# Patient Record
Sex: FEMALE | Race: BLACK OR AFRICAN AMERICAN | Employment: OTHER | URBAN - METROPOLITAN AREA
[De-identification: names, ages, dates, MRNs, and addresses within clinical notes are randomized per-mention and may not be internally consistent; named-entity substitution may affect disease eponyms.]

---

## 2017-01-04 ENCOUNTER — HOSPITAL ENCOUNTER (OUTPATIENT)
Dept: PHYSICAL THERAPY | Age: 62
Discharge: HOME | End: 2017-01-04
Payer: COMMERCIAL

## 2017-01-04 PROCEDURE — 97140 MANUAL THERAPY 1/> REGIONS: CPT

## 2017-01-04 PROCEDURE — 97110 THERAPEUTIC EXERCISES: CPT

## 2017-01-04 NOTE — PROGRESS NOTES
PT DAILY TREATMENT NOTE 2-15    Patient Name: Sana Pope  Date:2017  : 1955  [x]  Patient  Verified  Payor: [employer] - NON OBRA / Plan: [facility] HEALTH PLAN - NON OBRA / Product Type: Commerical /    In time: 8:05 AM  Out time: 9:07 AM  Total Treatment Time (min): 62 minutes  Visit #: 3     Treatment Area: Low back pain [M54.5]  Spondylosis without myelopathy or radiculopathy, lumbar region [M47.816]  Sciatica, left side [M54.32]    SUBJECTIVE  Pain Level (0-10 scale): 2/10  Any medication changes, allergies to medications, adverse drug reactions, diagnosis change, or new procedure performed?: [x] No    [] Yes (see summary sheet for update)  Subjective functional status/changes:   [] No changes reported  The Pt reports that her left hip is feeling better, but her left knee is bothering her. She states that it feels tight and achy and the lateral area of the knee to the ankle feels tired. She states that she has been having difficulty doing her exercises at home because of her work schedule and when she gets home she immediately wants to go to sleep. OBJECTIVE    47 min Therapeutic Exercise:  [x] See flow sheet :   Rationale: increase ROM, increase strength, improve coordination, improve balance and increase proprioception to improve the patients ability to perform work duties and ADLs with less pain or discomfort.     15 min Manual Therapy:  Lateral and inferior hip glides using the Mulligan belt bilaterally   Rationale: decrease pain, increase ROM and increase tissue extensibility  to improve the patients ability to perform work duties and ADLs          With   [x] TE   [] TA   [] neuro   [x] other: Patient Education: [x] Review HEP    [] Progressed/Changed HEP based on:   [] positioning   [] body mechanics   [] transfers   [] heat/ice application    [x] other: Helped Pt to problem solve how and when to perform her exercises at home (8:00 AM when she gets home she is going to make that her \"work-out hour\")     Other Objective/Functional Measures: None noted     Pain Level (0-10 scale) post treatment: 0/10    ASSESSMENT/Changes in Function:   The Pt was able to tolerate all of the additions to her exercise program well without any increased pain or discomfort. Patient will continue to benefit from skilled PT services to modify and progress therapeutic interventions, address functional mobility deficits, address ROM deficits, address strength deficits, analyze and address soft tissue restrictions, analyze and cue movement patterns, analyze and modify body mechanics/ergonomics, assess and modify postural abnormalities and instruct in home and community integration to attain remaining goals. []  See Plan of Care  []  See progress note/recertification  []  See Discharge Summary         Progress towards goals / Updated goals:  Short Term Goals: To be accomplished in 6 treatments:  1. The Pt will be independent and compliant with her HEP. 2. The Pt will report a 50% reduction in pain. Long Term Goals: To be accomplished in 12 treatments:  1. The Pt will score the MCII on her FOTO survey demonstrating improved overall function ( 60 to 65 points)  2. The Pt will be able to stand >/=2 hours without any radicular pain to allow Pt to perform work duties with less pain or discomfort.   3. The Pt will be able to ascend and descend 1 standard flight of steps with 15 lbs to allow Pt to be able to bring groceries in with less difficulty.     PLAN  [x]  Upgrade activities as tolerated     [x]  Continue plan of care  [x]  Update interventions per flow sheet       []  Discharge due to:_  []  Other:_      Marianne Miles, PT 1/4/2017  8:19 AM

## 2017-01-06 ENCOUNTER — HOSPITAL ENCOUNTER (OUTPATIENT)
Dept: PHYSICAL THERAPY | Age: 62
Discharge: HOME | End: 2017-01-06
Payer: COMMERCIAL

## 2017-01-06 PROCEDURE — 97110 THERAPEUTIC EXERCISES: CPT

## 2017-01-06 PROCEDURE — 97140 MANUAL THERAPY 1/> REGIONS: CPT

## 2017-01-06 NOTE — PROGRESS NOTES
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 She has been more diligent about doing her exercises at home. She reports that the radicular pain is less overall, and reports that standing still is becoming less pain. She states that her pain is not constant at work, but more intermittent with longer periods of relief. OBJECTIVE  54 min Therapeutic Exercise:  [x] See flow sheet :   Rationale: increase ROM, increase strength, improve coordination, improve balance and increase proprioception to improve the patients ability to perform ADLs and work duties with less pain or discomfort. 15 min Manual Therapy:  Lateral and inferior hip glides using the Mulligan belt bilaterally   Rationale: decrease pain, increase ROM and increase tissue extensibility  to improve the patients ability to perform ADLs and work duties with less pain or discomfort.     With   [x] TE   [] TA   [] neuro   [] other: Patient Education: [x] Review HEP    [] Progressed/Changed HEP based on:   [] positioning   [] body mechanics   [] transfers   [] heat/ice application    [] other:      Other Objective/Functional Measures: None noted     Pain Level (0-10 scale) post treatment: 0/10    ASSESSMENT/Changes in Function:   The Pt is progressing well towards her goals and able to tolerate her exercises well. She continues to have strength deficits that make stairs difficult, so exercises will be added in next session to help address this activity limitation. Patient will continue to benefit from skilled PT services to modify and progress therapeutic interventions, address functional mobility deficits, address ROM deficits, address strength deficits, analyze and address soft tissue restrictions, analyze and cue movement patterns, analyze and modify body mechanics/ergonomics, assess and modify postural abnormalities and instruct in home and community integration to attain remaining goals. []  See Plan of Care  []  See progress note/recertification  []  See Discharge Summary         Progress towards goals / Updated goals:  Short Term Goals: To be accomplished in 6 treatments:  1. The Pt will be independent and compliant with her HEP- progressing  2. The Pt will report a 50% reduction in pain- progressing  Long Term Goals: To be accomplished in 12 treatments:  1. The Pt will score the MCII on her FOTO survey demonstrating improved overall function ( 60 to 65 points)- progressing  2. The Pt will be able to stand >/=2 hours without any radicular pain to allow Pt to perform work duties with less pain or discomfort- progressing  3.  The Pt will be able to ascend and descend 1 standard flight of steps with 15 lbs to allow Pt to be able to bring groceries in with less difficulty- progressing     PLAN  [x]  Upgrade activities as tolerated     [x]  Continue plan of care  [x]  Update interventions per flow sheet       []  Discharge due to:_  []  Other:_      Lina Thomson, PT 1/6/2017  8:27 AM

## 2017-01-09 ENCOUNTER — HOSPITAL ENCOUNTER (OUTPATIENT)
Dept: PHYSICAL THERAPY | Age: 62
Discharge: HOME | End: 2017-01-09
Payer: COMMERCIAL

## 2017-01-09 PROCEDURE — 97110 THERAPEUTIC EXERCISES: CPT

## 2017-01-09 PROCEDURE — 97140 MANUAL THERAPY 1/> REGIONS: CPT

## 2017-01-09 NOTE — PROGRESS NOTES
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 She reports that it is still intermittent, but if she is standing still then she will notice the pain more. She reports that the pain is less intense as well and not bothering her as she walks into therapy. OBJECTIVE  60 min Therapeutic Exercise:  [x] See flow sheet :   Rationale: increase ROM, increase strength, improve coordination, improve balance and increase proprioception to improve the patients ability to ambulate and perform ADLs/work duties with less pain or discomfort. 15 min Manual Therapy:  Lateral and inferior hip glides using the Mulligan belt bilaterally   Rationale: decrease pain, increase ROM and increase tissue extensibility  to improve the patients ability to ambulate and perform ADLs/work duties with less pain or discomfort.     With   [x] TE   [] TA   [] neuro   [] other: Patient Education: [x] Review HEP    [] Progressed/Changed HEP based on:   [] positioning   [] body mechanics   [] transfers   [] heat/ice application    [] other:      Other Objective/Functional Measures: None noted     Pain Level (0-10 scale) post treatment: 0/10    ASSESSMENT/Changes in Function:   The Pt was able to tolerate the additions to her therapy program well. With the step ups, the Pt had better concentric and eccentric control on the right, but had difficulty controlling her left side. She is progressing well towards her goals. Patient will continue to benefit from skilled PT services to modify and progress therapeutic interventions, address functional mobility deficits, address ROM deficits, address strength deficits, analyze and address soft tissue restrictions, analyze and cue movement patterns, analyze and modify body mechanics/ergonomics, assess and modify postural abnormalities and instruct in home and community integration to attain remaining goals. []  See Plan of Care  []  See progress note/recertification  []  See Discharge Summary         Progress towards goals / Updated goals:  Short Term Goals: To be accomplished in 6 treatments:  1. The Pt will be independent and compliant with her HEP- progressing  2. The Pt will report a 50% reduction in pain- progressing  Long Term Goals: To be accomplished in 12 treatments:  1. The Pt will score the MCII on her FOTO survey demonstrating improved overall function ( 60 to 65 points)- progressing  2. The Pt will be able to stand >/=2 hours without any radicular pain to allow Pt to perform work duties with less pain or discomfort- progressing  3.  The Pt will be able to ascend and descend 1 standard flight of steps with 15 lbs to allow Pt to be able to bring groceries in with less difficulty- progressing    PLAN  [x]  Upgrade activities as tolerated     [x]  Continue plan of care  [x]  Update interventions per flow sheet       []  Discharge due to:_  []  Other:_      Funmi Pandey, PT 1/9/2017  7:45 AM

## 2017-01-11 ENCOUNTER — HOSPITAL ENCOUNTER (OUTPATIENT)
Dept: PHYSICAL THERAPY | Age: 62
Discharge: HOME | End: 2017-01-11
Payer: COMMERCIAL

## 2017-01-11 PROCEDURE — 97140 MANUAL THERAPY 1/> REGIONS: CPT

## 2017-01-11 PROCEDURE — 97110 THERAPEUTIC EXERCISES: CPT

## 2017-01-11 NOTE — PROGRESS NOTES
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 She had pain along the posterior and anterior left knee and described it as feeling incredibly tight. She states that since leaving work it has gone away. Her left hip pain is improving and she is having less frequent and intense left hip pain. OBJECTIVE    49 min Therapeutic Exercise:  [x] See flow sheet :   Rationale: increase ROM, increase strength, improve coordination, improve balance and increase proprioception to improve the patients ability to perform ADLs and work duties with less pain or discomfort. 15 min Manual Therapy:  Lateral and inferior hip glides using the Mulligan belt bilaterally   Rationale: decrease pain, increase ROM and increase tissue extensibility  to improve the patients ability to perform ADLs and work duties with less pain or discomfort.     With   [x] TE   [] TA   [] neuro   [] other: Patient Education: [x] Review HEP    [] Progressed/Changed HEP based on:   [] positioning   [] body mechanics   [] transfers   [] heat/ice application    [] other:      Other Objective/Functional Measures: None noted     Pain Level (0-10 scale) post treatment: 0/10    ASSESSMENT/Changes in Function:   The Pt was able to tolerate her therapy program very well. She had better eccentric control with her step ups both lateral and going forward. Patient will continue to benefit from skilled PT services to modify and progress therapeutic interventions, address functional mobility deficits, address ROM deficits, address strength deficits, analyze and address soft tissue restrictions, analyze and cue movement patterns, analyze and modify body mechanics/ergonomics, assess and modify postural abnormalities and instruct in home and community integration to attain remaining goals. []  See Plan of Care  []  See progress note/recertification  []  See Discharge Summary         Progress towards goals / Updated goals:  Short Term Goals: To be accomplished in 6 treatments:  1. The Pt will be independent and compliant with her HEP- progressing  2. The Pt will report a 50% reduction in pain- progressing  Long Term Goals: To be accomplished in 12 treatments:  1. The Pt will score the MCII on her FOTO survey demonstrating improved overall function ( 60 to 65 points)- progressing  2. The Pt will be able to stand >/=2 hours without any radicular pain to allow Pt to perform work duties with less pain or discomfort- progressing  3.  The Pt will be able to ascend and descend 1 standard flight of steps with 15 lbs to allow Pt to be able to bring groceries in with less difficulty- progressing    PLAN  [x]  Upgrade activities as tolerated     [x]  Continue plan of care  [x]  Update interventions per flow sheet       []  Discharge due to:_  []  Other:_      Casey Severin, PT 1/11/2017  7:48 AM

## 2017-01-23 ENCOUNTER — HOSPITAL ENCOUNTER (OUTPATIENT)
Dept: PHYSICAL THERAPY | Age: 62
Discharge: HOME | End: 2017-01-23
Payer: COMMERCIAL

## 2017-01-23 PROCEDURE — 97140 MANUAL THERAPY 1/> REGIONS: CPT

## 2017-01-23 PROCEDURE — 97110 THERAPEUTIC EXERCISES: CPT

## 2017-01-23 NOTE — PROGRESS NOTES
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 The Pt has progressed well with her therapeutic program that has focused on strengthening her lower extremity musculature, improving her core strength and stability, improving her hip capsule mobility, regaining her balance and neuromuscular control, stretching her hip musculature, improving her lumbar and hip ROM, reducing her pain, and improving her activity tolerance and endurance via therapeutic exercises and manual therapy techniques. At this time, the Pt is reporting less intense pain and less frequent pain during both her work duties and activities of daily living. The Pt states that she is now only having 1-2 episodes of pain throughout her work shift and states that it is much more bearable than prior to beginning therapy. She is reporting improved strength and is able to perform transfers more easily and with less discomfort. Overall, the Pt believes that she is 75% improved since beginning therapy. She continues to have deficits in her core strength and stability, decreased lower extremity strength and stability (mainly hip abduction and extension), restrictions in her hip capsule and musculature, and decreased activity tolerance and endurance.   Recommend continued PT for 2x a week for 5 more sessions to help improve the above listed impairments to allow the Pt to safely return to her prior level of function with less overall pain or discomfort. Progress towards goals / Updated goals:  Short Term Goals: To be accomplished in 6 treatments:  1. The Pt will be independent and compliant with her HEP- met  2. The Pt will report a 50% reduction in pain- met  Long Term Goals: To be accomplished in 12 treatments:  1. The Pt will score the MCII on her FOTO survey demonstrating improved overall function ( 60 to 65 points)- progressing (63/100)  2. The Pt will be able to stand >/=2 hours without any radicular pain to allow Pt to perform work duties with less pain or discomfort- progressing  3. The Pt will be able to ascend and descend 1 standard flight of steps with 15 lbs to allow Pt to be able to bring groceries in with less difficulty- progressing    Denia Jean PT 1/23/2017 8:40 AM    ________________________________________________________________________  NOTE TO PHYSICIAN:  Please complete the following and fax to: [facility]: [phone]  . Retain this original for your records. If you are unable to process this request in 24 hours, please contact our office.        ____ I have read the above report and request that my patient continue therapy with the following changes/special instructions:  ____ I have read the above report and request that my patient be discharged from therapy    Physician's Signature:_________________ Date:___________Time:__________

## 2017-01-25 ENCOUNTER — APPOINTMENT (OUTPATIENT)
Dept: PHYSICAL THERAPY | Age: 62
End: 2017-01-25
Payer: COMMERCIAL

## 2017-01-27 ENCOUNTER — HOSPITAL ENCOUNTER (OUTPATIENT)
Dept: PHYSICAL THERAPY | Age: 62
Discharge: HOME | End: 2017-01-27
Payer: COMMERCIAL

## 2017-01-27 PROCEDURE — 97110 THERAPEUTIC EXERCISES: CPT

## 2017-01-27 PROCEDURE — 97140 MANUAL THERAPY 1/> REGIONS: CPT

## 2017-01-27 NOTE — PROGRESS NOTES
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 She states that her pain was not too severe and more mild. She did have some pain, but reports that it was less than it usually is. OBJECTIVE    47 min Therapeutic Exercise:  [x] See flow sheet :   Rationale: increase ROM, increase strength, improve coordination, improve balance and increase proprioception to improve the patients ability to perform work duties and ADLs with less pain or discomfort. 15 min Manual Therapy:  Lateral and inferior hip glides using the Mulligan belt bilaterally   Rationale: decrease pain, increase ROM and increase tissue extensibility  to improve the patients ability to perform work duties and ADLs with less pain or discomfort.           With   [x] TE   [] TA   [] neuro   [] other: Patient Education: [x] Review HEP    [] Progressed/Changed HEP based on:   [] positioning   [] body mechanics   [] transfers   [] heat/ice application    [] other:      Other Objective/Functional Measures: None noted     Pain Level (0-10 scale) post treatment: 0/10    ASSESSMENT/Changes in Function:   The Pt was able to tolerate the additions to her exercise program well without any increased pain or discomfort. She is progressing well towards her goals and able to tolerate more challenging and functional exercises. Patient will continue to benefit from skilled PT services to modify and progress therapeutic interventions, address functional mobility deficits, address ROM deficits, address strength deficits, analyze and address soft tissue restrictions, analyze and cue movement patterns, analyze and modify body mechanics/ergonomics, assess and modify postural abnormalities and instruct in home and community integration to attain remaining goals. []  See Plan of Care  []  See progress note/recertification  []  See Discharge Summary         Progress towards goals / Updated goals:  Short Term Goals: To be accomplished in 6 treatments:  1. The Pt will be independent and compliant with her HEP- progressing  2. The Pt will report a 50% reduction in pain- progressing  Long Term Goals: To be accomplished in 12 treatments:  1. The Pt will score the MCII on her FOTO survey demonstrating improved overall function ( 60 to 65 points)- progressing  2. The Pt will be able to stand >/=2 hours without any radicular pain to allow Pt to perform work duties with less pain or discomfort- progressing  3.  The Pt will be able to ascend and descend 1 standard flight of steps with 15 lbs to allow Pt to be able to bring groceries in with less difficulty- progressing    PLAN  [x]  Upgrade activities as tolerated     [x]  Continue plan of care  [x]  Update interventions per flow sheet       []  Discharge due to:_  []  Other:_      Macario Bloch, PT 1/27/2017  8:10 AM

## 2017-02-03 ENCOUNTER — APPOINTMENT (OUTPATIENT)
Dept: PHYSICAL THERAPY | Age: 62
End: 2017-02-03
Payer: COMMERCIAL

## 2017-02-08 ENCOUNTER — HOSPITAL ENCOUNTER (OUTPATIENT)
Dept: PHYSICAL THERAPY | Age: 62
Discharge: HOME | End: 2017-02-08
Payer: COMMERCIAL

## 2017-02-08 PROCEDURE — 97110 THERAPEUTIC EXERCISES: CPT

## 2017-02-08 PROCEDURE — 97140 MANUAL THERAPY 1/> REGIONS: CPT

## 2017-02-08 NOTE — PROGRESS NOTES
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 She states that she did have some left knee pain while at work, but it is much better. She got a pedicure almost 3 weeks ago and got \"knicked\" under the base of the 5th left metatarsal and it still is not healed properly. She is going to call her podiatrist today to make an appointment to be seen because she has diabetes and is concerned that it is not healed. OBJECTIVE    47 min Therapeutic Exercise:  [x] See flow sheet :   Rationale: increase ROM, increase strength, improve coordination, improve balance and increase proprioception to improve the patients ability to perform ADLs and work duties with less pain or discomfort. 15 min Manual Therapy:  Inferior and lateral hip glides bilaterally with the Mulligan belt   Rationale: decrease pain, increase ROM and increase tissue extensibility  to improve the patients ability to perform ADLs and work duties with less pain or discomfort.           With   [x] TE   [] TA   [] neuro   [] other: Patient Education: [x] Review HEP    [] Progressed/Changed HEP based on:   [] positioning   [] body mechanics   [] transfers   [] heat/ice application    [] other:      Other Objective/Functional Measures: Pt has antalgic gait with decreased left stance time. Observation of left foot- very small laceration over callous on the head of the 5th met on the dorsum of the foot- no visible signs of infection (no drainage or redness surrounding the cut)    Pain Level (0-10 scale) post treatment: 0/10    ASSESSMENT/Changes in Function:   The Pt did require verbal cues to maintain proper form and technique of her newer exercises. No new exercises were added in today secondary to the Pt's increased left foot pain and antalgic gait. Patient will continue to benefit from skilled PT services to modify and progress therapeutic interventions, address functional mobility deficits, address ROM deficits, address strength deficits, analyze and address soft tissue restrictions, analyze and cue movement patterns, analyze and modify body mechanics/ergonomics, assess and modify postural abnormalities and instruct in home and community integration to attain remaining goals. []  See Plan of Care  []  See progress note/recertification  []  See Discharge Summary         Progress towards goals / Updated goals:  Short Term Goals: To be accomplished in 6 treatments:  1. The Pt will be independent and compliant with her HEP- progressing  2. The Pt will report a 50% reduction in pain- progressing  Long Term Goals: To be accomplished in 12 treatments:  1. The Pt will score the MCII on her FOTO survey demonstrating improved overall function ( 60 to 65 points)- progressing  2. The Pt will be able to stand >/=2 hours without any radicular pain to allow Pt to perform work duties with less pain or discomfort- progressing  3.  The Pt will be able to ascend and descend 1 standard flight of steps with 15 lbs to allow Pt to be able to bring groceries in with less difficulty- progressing    PLAN  [x]  Upgrade activities as tolerated     [x]  Continue plan of care  [x]  Update interventions per flow sheet       []  Discharge due to:_  [] Other:_      Erick Mosqueda, PT 2/8/2017  7:46 AM

## 2017-02-13 ENCOUNTER — HOSPITAL ENCOUNTER (OUTPATIENT)
Dept: PHYSICAL THERAPY | Age: 62
Discharge: HOME | End: 2017-02-13
Payer: COMMERCIAL

## 2017-02-13 PROCEDURE — 97110 THERAPEUTIC EXERCISES: CPT

## 2017-02-13 PROCEDURE — 97140 MANUAL THERAPY 1/> REGIONS: CPT

## 2017-02-13 NOTE — PROGRESS NOTES
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 She reports that this is the first time that her back has bothered her in a long time because it had just been down the left knee. She reports that she did not get a chance to go to her podiatrist last week because she was not feeling well, but is going to make it a priority this week. OBJECTIVE  61 min Therapeutic Exercise:  [x] See flow sheet :   Rationale: increase ROM, increase strength, improve coordination, improve balance and increase proprioception to improve the patients ability to perform ADLs and work duties with less pain or discomfort. 15 min Manual Therapy:  Inferior and lateral hip glides bilaterally with the Mulligan belt   Rationale: decrease pain, increase ROM and increase tissue extensibility  to improve the patients ability to perform ADLs and work duties with less pain or discomfort.           With   [x] TE   [] TA   [] neuro   [] other: Patient Education: [x] Review HEP    [] Progressed/Changed HEP based on:   [] positioning   [] body mechanics   [] transfers   [] heat/ice application    [] other:      Other Objective/Functional Measures: None noted     Pain Level (0-10 scale) post treatment: 0/10    ASSESSMENT/Changes in Function:   The Pt was able to tolerate the additions to her therapy program and had complete relief of symptoms at the end of the session. She is progressing well towards her goals. Patient will continue to benefit from skilled PT services to modify and progress therapeutic interventions, address functional mobility deficits, address ROM deficits, address strength deficits, analyze and address soft tissue restrictions, analyze and cue movement patterns, analyze and modify body mechanics/ergonomics, assess and modify postural abnormalities and instruct in home and community integration to attain remaining goals. []  See Plan of Care  []  See progress note/recertification  []  See Discharge Summary         Progress towards goals / Updated goals:  Short Term Goals: To be accomplished in 6 treatments:  1. The Pt will be independent and compliant with her HEP- progressing  2. The Pt will report a 50% reduction in pain- progressing  Long Term Goals: To be accomplished in 12 treatments:  1. The Pt will score the MCII on her FOTO survey demonstrating improved overall function ( 60 to 65 points)- progressing  2. The Pt will be able to stand >/=2 hours without any radicular pain to allow Pt to perform work duties with less pain or discomfort- progressing  3.  The Pt will be able to ascend and descend 1 standard flight of steps with 15 lbs to allow Pt to be able to bring groceries in with less difficulty- progressing    PLAN  [x]  Upgrade activities as tolerated     [x]  Continue plan of care  [x]  Update interventions per flow sheet       []  Discharge due to:_  []  Other:_      Doni Yusuf, PT 2/13/2017  7:45 AM

## 2017-02-15 ENCOUNTER — HOSPITAL ENCOUNTER (OUTPATIENT)
Dept: PHYSICAL THERAPY | Age: 62
Discharge: HOME | End: 2017-02-15
Payer: COMMERCIAL

## 2017-02-15 PROCEDURE — 97140 MANUAL THERAPY 1/> REGIONS: CPT

## 2017-02-15 PROCEDURE — 97110 THERAPEUTIC EXERCISES: CPT

## 2017-02-15 NOTE — PROGRESS NOTES
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 She states that she was probably just tired and had worked a lot, but it dissipated after therapy. She reports that she spoke with her podiatrist and had an appointment for next month to have her feet checked. She reports that she is feeling much better overall. OBJECTIVE  55 min Therapeutic Exercise:  [x] See flow sheet :   Rationale: increase ROM, increase strength, improve coordination, improve balance and increase proprioception to improve the patients ability to perform ADLs and work duties with less pain or discomfort. 15 min Manual Therapy:  Inferior and lateral hip glides bilaterally with the Mulligan belt   Rationale: decrease pain, increase ROM and increase tissue extensibility  to improve the patients ability to perform ADLs and work duties with less pain or discomfort.     With   [x] TE   [] TA   [] neuro   [] other: Patient Education: [x] Review HEP    [] Progressed/Changed HEP based on:   [] positioning   [] body mechanics   [] transfers   [] heat/ice application    [] other:      Other Objective/Functional Measures: None noted     Pain Level (0-10 scale) post treatment: 0/10    ASSESSMENT/Changes in Function:   The Pt tolerated her therapy program well without any increased lower back pain or discomfort. The Pt is progressing very well towards her goals and anticipate discharge from skilled PT after her next PT session. Patient will continue to benefit from skilled PT services to modify and progress therapeutic interventions, address functional mobility deficits, address ROM deficits, address strength deficits, analyze and address soft tissue restrictions, analyze and cue movement patterns, analyze and modify body mechanics/ergonomics, assess and modify postural abnormalities and instruct in home and community integration to attain remaining goals. []  See Plan of Care  []  See progress note/recertification  []  See Discharge Summary         Progress towards goals / Updated goals:  Short Term Goals: To be accomplished in 6 treatments:  1. The Pt will be independent and compliant with her HEP- met  2. The Pt will report a 50% reduction in pain- met  Long Term Goals: To be accomplished in 12 treatments:  1. The Pt will score the MCII on her FOTO survey demonstrating improved overall function ( 60 to 65 points)- progressing  2. The Pt will be able to stand >/=2 hours without any radicular pain to allow Pt to perform work duties with less pain or discomfort- progressing (less frequent)  3.  The Pt will be able to ascend and descend 1 standard flight of steps with 15 lbs to allow Pt to be able to bring groceries in with less difficulty- met    PLAN  [x]  Upgrade activities as tolerated     [x]  Continue plan of care  [x]  Update interventions per flow sheet       []  Discharge due to:_  []  Other:_      Greg Hernández, PT 2/15/2017  7:58 AM

## 2017-04-10 NOTE — ANCILLARY DISCHARGE INSTRUCTIONS
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 Final objective data and outcomes were unable to be obtained.     RECOMMENDATIONS:  [x]Discontinue therapy: []Patient has reached or is progressing toward set goals      [x]Patient is non-compliant or has abdicated      []Due to lack of appreciable progress towards set goals      []Other    Monik Barrios, PT 4/10/2017 8:20 AM

## 2018-02-27 ENCOUNTER — HOSPITAL ENCOUNTER (OUTPATIENT)
Dept: MAMMOGRAPHY | Age: 63
Discharge: HOME | End: 2018-02-27
Attending: OBSTETRICS & GYNECOLOGY
Payer: COMMERCIAL

## 2018-02-27 DIAGNOSIS — Z12.39 SCREENING BREAST EXAMINATION: ICD-10-CM

## 2018-02-27 PROCEDURE — 77067 SCR MAMMO BI INCL CAD: CPT

## 2019-02-28 ENCOUNTER — HOSPITAL ENCOUNTER (OUTPATIENT)
Dept: MAMMOGRAPHY | Age: 64
Discharge: HOME | End: 2019-02-28
Attending: NURSE PRACTITIONER
Payer: MEDICARE

## 2019-02-28 DIAGNOSIS — Z12.39 SCREENING BREAST EXAMINATION: ICD-10-CM

## 2019-02-28 PROCEDURE — 77067 SCR MAMMO BI INCL CAD: CPT

## 2020-03-20 ENCOUNTER — HOSPITAL ENCOUNTER (OUTPATIENT)
Dept: MAMMOGRAPHY | Age: 65
Discharge: HOME | End: 2020-03-20
Attending: NURSE PRACTITIONER
Payer: MEDICARE

## 2020-03-20 DIAGNOSIS — Z12.31 VISIT FOR SCREENING MAMMOGRAM: ICD-10-CM

## 2020-03-20 PROCEDURE — 77063 BREAST TOMOSYNTHESIS BI: CPT

## 2021-03-12 NOTE — PROGRESS NOTES
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 She states that she did a lot of standing and walking while on vacation. She states that she had a few occasions that she felt very tired while walking after she had been standing for a long period of time. She reports that she feels stronger in her legs. She worked last night and did have some pain along the left side of her leg, but it did not last too long. OBJECTIVE    5k0 min Therapeutic Exercise:  [x] See flow sheet :   Rationale: increase ROM, increase strength, improve coordination, improve balance and increase proprioception to improve the patients ability to perform ADLs and work duties with less pain or discomfort. 15 min Manual Therapy:  Lateral and inferior hip glides using the Mulligan belt bilaterally   Rationale: decrease pain, increase ROM and increase tissue extensibility  to improve the patients ability to perform ADLs and work duties with less pain or discomfort.     With   [x] TE   [] TA   [] neuro   [] other: Patient Education: [x] Review HEP    [] Progressed/Changed HEP based on:   [] positioning   [] body mechanics   [] transfers   [] heat/ice application    [] other:      Other Objective/Functional Measures: FOTO 63/100 (60/100 at initial evaluation)     Pain Level (0-10 scale) post treatment: 0/10    ASSESSMENT/Changes in Function:     Patient will continue to benefit from skilled PT services to modify and progress therapeutic interventions, address functional mobility deficits, address ROM deficits, address strength deficits, analyze and address soft tissue restrictions, analyze and cue movement patterns, analyze and modify body mechanics/ergonomics, assess and modify postural abnormalities and instruct in home and community integration to attain remaining goals. []  See Plan of Care  [x]  See progress note/recertification  []  See Discharge Summary         Progress towards goals / Updated goals:  Short Term Goals: To be accomplished in 6 treatments:  1. The Pt will be independent and compliant with her HEP- progressing  2. The Pt will report a 50% reduction in pain- progressing  Long Term Goals: To be accomplished in 12 treatments:  1. The Pt will score the MCII on her FOTO survey demonstrating improved overall function ( 60 to 65 points)- progressing  2. The Pt will be able to stand >/=2 hours without any radicular pain to allow Pt to perform work duties with less pain or discomfort- progressing  3.  The Pt will be able to ascend and descend 1 standard flight of steps with 15 lbs to allow Pt to be able to bring groceries in with less difficulty- progressing    PLAN  [x]  Upgrade activities as tolerated     [x]  Continue plan of care  [x]  Update interventions per flow sheet       []  Discharge due to:_  []  Other:_      Monik Barrios, PT 1/23/2017  8:06 AM normal S1, S2 heard

## 2021-03-19 ENCOUNTER — TRANSCRIBE ORDER (OUTPATIENT)
Dept: SCHEDULING | Age: 66
End: 2021-03-19

## 2021-03-19 DIAGNOSIS — Z12.31 VISIT FOR SCREENING MAMMOGRAM: Primary | ICD-10-CM

## 2021-04-23 ENCOUNTER — HOSPITAL ENCOUNTER (OUTPATIENT)
Dept: MAMMOGRAPHY | Age: 66
Discharge: HOME | End: 2021-04-23
Attending: NURSE PRACTITIONER
Payer: MEDICARE

## 2021-04-23 DIAGNOSIS — Z12.31 VISIT FOR SCREENING MAMMOGRAM: ICD-10-CM

## 2021-04-23 PROCEDURE — 77063 BREAST TOMOSYNTHESIS BI: CPT

## 2022-04-29 ENCOUNTER — TRANSCRIBE ORDER (OUTPATIENT)
Dept: SCHEDULING | Age: 67
End: 2022-04-29

## 2022-04-29 DIAGNOSIS — Z12.31 VISIT FOR SCREENING MAMMOGRAM: Primary | ICD-10-CM

## 2022-06-17 ENCOUNTER — HOSPITAL ENCOUNTER (OUTPATIENT)
Dept: MAMMOGRAPHY | Age: 67
Discharge: HOME | End: 2022-06-17
Attending: NURSE PRACTITIONER
Payer: MEDICARE

## 2022-06-17 DIAGNOSIS — Z12.31 VISIT FOR SCREENING MAMMOGRAM: ICD-10-CM

## 2022-06-17 PROCEDURE — 77063 BREAST TOMOSYNTHESIS BI: CPT

## 2022-06-23 ENCOUNTER — HOSPITAL ENCOUNTER (OUTPATIENT)
Dept: MAMMOGRAPHY | Age: 67
Discharge: HOME | End: 2022-06-23
Attending: NURSE PRACTITIONER
Payer: MEDICARE

## 2022-06-23 DIAGNOSIS — R92.8 ABNORMAL MAMMOGRAM: ICD-10-CM

## 2022-06-23 PROCEDURE — 77065 DX MAMMO INCL CAD UNI: CPT

## 2023-06-30 ENCOUNTER — TRANSCRIBE ORDERS (OUTPATIENT)
Facility: HOSPITAL | Age: 68
End: 2023-06-30

## 2023-06-30 DIAGNOSIS — Z12.31 OTHER SCREENING MAMMOGRAM: Primary | ICD-10-CM

## 2023-07-21 ENCOUNTER — HOSPITAL ENCOUNTER (OUTPATIENT)
Facility: HOSPITAL | Age: 68
End: 2023-07-21
Payer: MEDICARE

## 2023-07-21 DIAGNOSIS — Z12.31 OTHER SCREENING MAMMOGRAM: ICD-10-CM

## 2023-07-21 PROCEDURE — 77063 BREAST TOMOSYNTHESIS BI: CPT

## 2025-05-12 ENCOUNTER — TRANSCRIBE ORDERS (OUTPATIENT)
Facility: HOSPITAL | Age: 70
End: 2025-05-12

## 2025-05-12 DIAGNOSIS — Z12.31 BREAST CANCER SCREENING BY MAMMOGRAM: Primary | ICD-10-CM

## 2025-06-26 ENCOUNTER — HOSPITAL ENCOUNTER (OUTPATIENT)
Facility: HOSPITAL | Age: 70
Discharge: HOME | End: 2025-06-29
Payer: MEDICARE

## 2025-06-26 DIAGNOSIS — Z78.0 ASYMPTOMATIC MENOPAUSAL STATE: ICD-10-CM

## 2025-06-26 PROCEDURE — 77080 DXA BONE DENSITY AXIAL: CPT

## 2025-07-08 ENCOUNTER — HOSPITAL ENCOUNTER (OUTPATIENT)
Facility: HOSPITAL | Age: 70
Discharge: HOME | End: 2025-07-11
Payer: COMMERCIAL

## 2025-07-08 DIAGNOSIS — Z12.31 BREAST CANCER SCREENING BY MAMMOGRAM: ICD-10-CM

## 2025-07-08 PROCEDURE — 77063 BREAST TOMOSYNTHESIS BI: CPT
